# Patient Record
Sex: FEMALE | Race: WHITE | Employment: PART TIME | URBAN - METROPOLITAN AREA
[De-identification: names, ages, dates, MRNs, and addresses within clinical notes are randomized per-mention and may not be internally consistent; named-entity substitution may affect disease eponyms.]

---

## 2020-02-28 ENCOUNTER — HOSPITAL ENCOUNTER (OUTPATIENT)
Dept: CARDIAC REHAB | Age: 75
Setting detail: THERAPIES SERIES
Discharge: HOME | End: 2020-02-28
Payer: MEDICARE

## 2020-02-28 VITALS
HEIGHT: 68 IN | RESPIRATION RATE: 18 BRPM | OXYGEN SATURATION: 98 % | DIASTOLIC BLOOD PRESSURE: 68 MMHG | WEIGHT: 138 LBS | HEART RATE: 85 BPM | BODY MASS INDEX: 20.92 KG/M2 | SYSTOLIC BLOOD PRESSURE: 110 MMHG

## 2020-02-28 RX ORDER — ACETAMINOPHEN 160 MG
2000 TABLET,DISINTEGRATING ORAL
COMMUNITY

## 2020-02-28 RX ORDER — ATORVASTATIN CALCIUM 40 MG/1
40 TABLET, FILM COATED ORAL DAILY
COMMUNITY

## 2020-02-28 RX ORDER — MIRTAZAPINE 30 MG/1
30 TABLET, FILM COATED ORAL NIGHTLY
COMMUNITY

## 2020-02-28 RX ORDER — ALBUTEROL SULFATE 2.5 MG/3ML
2.5 SOLUTION RESPIRATORY (INHALATION) 2 TIMES DAILY
COMMUNITY

## 2020-02-28 RX ORDER — HYDROXYCHLOROQUINE SULFATE 200 MG/1
200 TABLET, FILM COATED ORAL 2 TIMES DAILY
COMMUNITY

## 2020-02-28 RX ORDER — BUDESONIDE AND FORMOTEROL FUMARATE DIHYDRATE 160; 4.5 UG/1; UG/1
2 AEROSOL RESPIRATORY (INHALATION) 2 TIMES DAILY
COMMUNITY

## 2020-02-28 RX ORDER — OMEPRAZOLE 20 MG/1
20 CAPSULE, DELAYED RELEASE ORAL 2 TIMES DAILY
COMMUNITY

## 2020-02-28 RX ORDER — ESCITALOPRAM OXALATE 20 MG/1
20 TABLET ORAL DAILY
COMMUNITY

## 2020-02-28 RX ORDER — ASCORBIC ACID 500 MG
1000 TABLET ORAL DAILY
COMMUNITY

## 2020-02-28 SDOH — HEALTH STABILITY: MENTAL HEALTH: HOW OFTEN DO YOU HAVE A DRINK CONTAINING ALCOHOL?: NEVER

## 2020-02-28 ASSESSMENT — PATIENT HEALTH QUESTIONNAIRE - PHQ9: SUM OF ALL RESPONSES TO PHQ QUESTIONS 1-9: 0

## 2020-03-02 ENCOUNTER — HOSPITAL ENCOUNTER (OUTPATIENT)
Dept: CARDIAC REHAB | Age: 75
Setting detail: THERAPIES SERIES
Discharge: HOME | End: 2020-03-02
Payer: MEDICARE

## 2020-03-02 PROCEDURE — 93798 PHYS/QHP OP CAR RHAB W/ECG: CPT

## 2020-03-04 ENCOUNTER — HOSPITAL ENCOUNTER (OUTPATIENT)
Dept: CARDIAC REHAB | Age: 75
Setting detail: THERAPIES SERIES
Discharge: HOME | End: 2020-03-04
Payer: MEDICARE

## 2020-03-04 PROCEDURE — 93798 PHYS/QHP OP CAR RHAB W/ECG: CPT

## 2020-03-06 ENCOUNTER — HOSPITAL ENCOUNTER (OUTPATIENT)
Dept: CARDIAC REHAB | Age: 75
Setting detail: THERAPIES SERIES
Discharge: HOME | End: 2020-03-06
Payer: MEDICARE

## 2020-03-06 PROCEDURE — 93798 PHYS/QHP OP CAR RHAB W/ECG: CPT

## 2020-03-09 ENCOUNTER — HOSPITAL ENCOUNTER (OUTPATIENT)
Dept: CARDIAC REHAB | Age: 75
Setting detail: THERAPIES SERIES
Discharge: HOME | End: 2020-03-09
Payer: MEDICARE

## 2020-03-09 PROCEDURE — 93798 PHYS/QHP OP CAR RHAB W/ECG: CPT

## 2020-03-11 ENCOUNTER — HOSPITAL ENCOUNTER (OUTPATIENT)
Dept: CARDIAC REHAB | Age: 75
Setting detail: THERAPIES SERIES
Discharge: HOME | End: 2020-03-11
Payer: MEDICARE

## 2020-03-11 PROCEDURE — 93798 PHYS/QHP OP CAR RHAB W/ECG: CPT

## 2020-03-13 ENCOUNTER — HOSPITAL ENCOUNTER (OUTPATIENT)
Dept: CARDIAC REHAB | Age: 75
Setting detail: THERAPIES SERIES
End: 2020-03-13
Payer: MEDICARE

## 2020-03-16 ENCOUNTER — HOSPITAL ENCOUNTER (OUTPATIENT)
Dept: CARDIAC REHAB | Age: 75
Setting detail: THERAPIES SERIES
End: 2020-03-16
Payer: MEDICARE

## 2020-03-18 ENCOUNTER — APPOINTMENT (OUTPATIENT)
Dept: CARDIAC REHAB | Age: 75
End: 2020-03-18
Payer: MEDICARE

## 2020-03-20 ENCOUNTER — APPOINTMENT (OUTPATIENT)
Dept: CARDIAC REHAB | Age: 75
End: 2020-03-20
Payer: MEDICARE

## 2020-03-23 ENCOUNTER — APPOINTMENT (OUTPATIENT)
Dept: CARDIAC REHAB | Age: 75
End: 2020-03-23
Payer: MEDICARE

## 2020-03-25 ENCOUNTER — APPOINTMENT (OUTPATIENT)
Dept: CARDIAC REHAB | Age: 75
End: 2020-03-25
Payer: MEDICARE

## 2020-03-27 ENCOUNTER — APPOINTMENT (OUTPATIENT)
Dept: CARDIAC REHAB | Age: 75
End: 2020-03-27
Payer: MEDICARE

## 2020-03-30 ENCOUNTER — HOSPITAL ENCOUNTER (OUTPATIENT)
Dept: CARDIAC REHAB | Age: 75
Setting detail: THERAPIES SERIES
End: 2020-03-30
Payer: MEDICARE

## 2020-04-06 ENCOUNTER — TELEPHONE (OUTPATIENT)
Dept: CARDIAC REHAB | Age: 75
End: 2020-04-06

## 2020-06-02 ENCOUNTER — TELEPHONE (OUTPATIENT)
Dept: CARDIAC REHAB | Age: 75
End: 2020-06-02

## 2024-05-28 PROBLEM — R92.30 DENSE BREAST TISSUE: Status: ACTIVE | Noted: 2024-05-28

## 2024-05-28 PROBLEM — Z12.31 BREAST CANCER SCREENING BY MAMMOGRAM: Status: ACTIVE | Noted: 2024-05-28

## 2024-05-28 PROBLEM — Z85.3 ENCOUNTER FOR FOLLOW-UP SURVEILLANCE OF BREAST CANCER: Status: ACTIVE | Noted: 2024-05-28

## 2024-05-28 PROBLEM — Z08 ENCOUNTER FOR FOLLOW-UP SURVEILLANCE OF BREAST CANCER: Status: ACTIVE | Noted: 2024-05-28

## 2024-05-28 NOTE — PROGRESS NOTES
[facility], [place], operated by [facility]  Winston Jimenez female   1945 78 y.o.   [id number]      Chief Complaint  Annual mammogram and exam, history left breast cancer.    History Of Present Illness  Winston Jimenez is a very pleasant 78 y.o.  female diagnosed 2002 with left invasive ductal carcinoma. Her tumor was 4 cm, grade 3, ER +50%, CO negative, HER-2/khurram-positive (3+). She had left lumpectomy with axillary lymph node dissection, + positive lymph nodes. She required a re-excision to clear her margins. Patient was on BIRG 006 trial with chemotherapy AC x4, Taxotere x4+ one year of Herceptin therapy. She completed radiation therapy. She took Tamoxifen 2003 to May 2005. She took Arimidex 2005-2008. She was genetically tested BRCA Gene mutation carrier negative. She has family history of breast cancer in her mother diagnosed age 65. Her sister was diagnosed with ovarian and uterine cancer and and lived one year beyond her diagnosis. She had open heart surgery in  with aortic valve replacement and mitral valve repair at [facility]. She has an autoimmune skin condition granuloma annular. She has personal history of nonmelanoma skin cancer & sees dermatology regularly. She presents today for annual mammogram and exam.  T3 N2 M0    BREAST IMAGIN2024 Bilateral screening mammogram, indicates BI-RADS Category 2.    REPRODUCTIVE HISTORY: menarche age 14, , first birth age 21, natural menopause age 56 s/p total hysterectomy years later, heterogeneously dense breast tissue    FAMILY CANCER HISTORY  Mother: Breast cancer age 65  Sister: Uterine & Ovarian cancer  Paternal grandmother: Colon cancer age 65      Review of Systems  Constitutional:  Negative for appetite change, fatigue, fever and unexpected weight change.   HENT:  Negative for ear pain, hearing loss, nosebleeds, sore throat and trouble swallowing.    Eyes:  Negative for discharge, itching and visual disturbance.   Breast:  As stated in HPI.  Respiratory:  Negative for cough, chest tightness and shortness of breath.    Cardiovascular:  Negative for chest pain, palpitations and leg swelling.   Gastrointestinal:  Negative for abdominal pain, constipation, diarrhea and nausea.   Endocrine: Negative for cold intolerance and heat intolerance.   Genitourinary:  Negative for dysuria, frequency, hematuria, pelvic pain and vaginal bleeding.   Musculoskeletal:  Negative for arthralgias, back pain, gait problem, joint swelling and myalgias.   Skin:  Negative for color change and rash.   Allergic/Immunologic: Negative for environmental allergies and food allergies.   Neurological:  Negative for dizziness, tremors, speech difficulty, weakness, numbness and headaches.   Hematological:  Does not bruise/bleed easily.   Psychiatric/Behavioral:  Negative for agitation, dysphoric mood and sleep disturbance. The patient is not nervous/anxious.      Past Medical History  She has a past medical history of Breast cancer (Multi), antineoplastic chemo, Pain in unspecified hand (04/18/2017), Personal history of irradiation, Personal history of malignant neoplasm of breast (09/26/2018), Personal history of other diseases of the circulatory system, Personal history of other diseases of the nervous system and sense organs, Personal history of other mental and behavioral disorders, Personal history of other mental and behavioral disorders, and Stress incontinence (female) (male).    Surgical History  She has a past surgical history that includes Breast lumpectomy (10/15/2013); Other surgical history (10/15/2013); and Hysterectomy (10/21/2015).    Family History  Cancer-related family history is not on file.     Social History  She reports that she has never smoked. She has never used smokeless tobacco. She reports that she does not currently use alcohol. She reports that she does not use drugs.    Allergies  Codeine; Latex, natural rubber; Mold; Pollen extracts;  and Neomycin-bacitracnzn-polymyxnb    Medications  Current Outpatient Medications   Medication Instructions    albuterol 2.5 mg /3 mL (0.083 %) nebulizer solution INHALE THE CONTENTS OF 1 VIAL VIA NEBULIZER 4 TIMES A DAY AS NEEDED FOR COUGH  WHEEZE  OR SHORTNESS OF BREATH    albuterol 90 mcg/actuation inhaler INHALE 2 PUFFS INTO THE LUNGS 4 TIMES A DAY AS NEEDED FOR COUGH  WHEEZING  OR SHORTNESS OF BREATH    amitriptyline (Elavil) 25 mg tablet     amoxicillin (Amoxil) 500 mg capsule TAKE 4 CAPSULES BY MOUTH 1 HOUR PRIOR TO DENTAL APPOINTMENT    aspirin (Aspir-81) 81 mg EC tablet oral    atorvastatin (Lipitor) 40 mg tablet 1 tablet, oral, Daily    beclomethasone dipropionate (Qvar) 80 mcg/actuation inhaler     budesonide-formoteroL (Symbicort) 160-4.5 mcg/actuation inhaler 2 puffs, inhalation, 2 times daily    budesonide-formoteroL (Symbicort) 160-4.5 mcg/actuation inhaler oral    carvedilol (Coreg) 3.125 mg tablet 1 tablet, oral, 2 times daily    cholecalciferol (Vitamin D-3) 50 mcg (2,000 unit) capsule oral    desvenlafaxine 100 mg 24 hr tablet 1 tablet, oral, Daily    Entresto 24-26 mg tablet 1 tablet, oral, 2 times daily    eplerenone (Inspra) 25 mg tablet 1 tablet, oral, Daily    escitalopram (Lexapro) 20 mg tablet 1 tablet, oral, Nightly    fluticasone propion-salmeteroL (Advair HFA) 230-21 mcg/actuation inhaler     [START ON 7/1/2024] hydroxychloroquine (Plaquenil) 200 mg tablet 1 tablet, oral, Daily    melatonin 3 mg tablet oral    neomycin-polymyxin-dexAMETHasone (Maxitrol) 3.5mg/mL-10,000 unit/mL-0.1 % ophthalmic suspension     ofloxacin (Ocuflox) 0.3 % ophthalmic solution     omeprazole (PRILOSEC) 20 mg, oral, 2 times daily    perphenazine-amitriptyline 4-50 mg tablet 1 tablet, oral, Nightly       Last Recorded Vitals  Vitals:    05/29/24 0847   BP: 112/65   Pulse: 65       Physical Exam  Chest:         Patient is alert and oriented x3 and in a relaxed and appropriate mood. Her gait is steady and hand  grasps are equal. Sclera is clear. The breasts are slightly asymmetrical right > left. The left breast has a well healed inframammary incision with tissue divot and axillary incision. The skin is thickened s/p radiation changes. The right breast tissue is soft without palpable abnormalities, discrete nodules or masses. The right breast skin and bilateral nipples appear normal. There is no cervical, supraclavicular or axillary lymphadenopathy. Heart rate and rhythm normal, murmur appreciated. The lungs are clear to auscultation bilaterally. Abdomen is soft and non-tender.     Relevant Results and Imaging  Study Result    Narrative & Impression   Interpreted By:  Pauline Lees,   STUDY:  BI MAMMO BILATERAL SCREENING TOMOSYNTHESIS;  5/29/2024 8:37 am      ACCESSION NUMBER(S):  [id number]      ORDERING CLINICIAN:  ARGENTINA STYLES      INDICATION:  Screening. Left lumpectomy with radiation treatment and chemotherapy.      COMPARISON:  05/24/2023, 04/26/2022, 04/13/2021, 03/12/2019      FINDINGS:  2D and tomosynthesis images were reviewed at 1 mm slice thickness.      Density:  The breast tissue is heterogeneously dense, which may  obscure small masses.      Stable postsurgical scarring in the deep central left breast, the  site of lumpectomy. Stable postsurgical scarring overlying the left  axilla. Stable minimal trabecular thickening of the left breast  consistent with radiation treatment. No suspicious masses or  calcifications are identified within either breast.      IMPRESSION:  No mammographic evidence of malignancy. Stable postoperative and  postradiation changes, left breast. Routine postsurgical surveillance  recommended.      BI-RADS CATEGORY:      BI-RADS Category:  2 Benign.  Recommendation:  Annual Screening.  Recommended Date:  1 Year.  Laterality:  Bilateral.      For any future breast imaging appointments, please call [phone]  [phone]).                  MACRO:  None      Signed by: Pauline Lees 5/29/2024  8:47 AM     I explained the results in depth, along with suggested explanation for follow up recommendations based on the testing results. BI-RADS Category 2    Orders  Orders Placed This Encounter   Procedures    BI mammo bilateral screening tomosynthesis     Standing Status:   Future     Standing Expiration Date:   7/29/2025     Order Specific Question:   Reason for exam:     Answer:   annual screening mammogram     Order Specific Question:   Radiologist to Determine Optimal Study     Answer:   Yes     Order Specific Question:   Release result to [employer]     Answer:   Immediate     Order Specific Question:   Is this exam part of a Research Study? If Yes, link this order to the research study     Answer:   No       Visit Diagnosis  1. Encounter for follow-up surveillance of breast cancer  Clinic Appointment Request Follow Up    CANCELED: Clinic Appointment Request Follow Up      2. Dense breast tissue        3. Breast cancer screening by mammogram  BI mammo bilateral screening tomosynthesis    CANCELED: BI mammo bilateral screening tomosynthesis      4. BRCA negative            Assessment/Plan  Normal clinical exam and imaging, remote history left breast cancer, s/p left breast lumpectomy, s/p chemotherapy, s/p radiation therapy, s/p endocrine therapy, BRCA negative, family history breast cancer, heterogeneously dense     Plan:  Return May 2025 for bilateral screening mammogram and exam. Prefers to follow in the breast center.    Patient Discussion/Summary  Your clinical examination and imaging are normal. Please return in one year for mammogram and office visit or sooner if you have any problems or concerns.     You can see your health information, review clinical summaries from office visits & test results online when you follow your health with MY  Chart, a personal health record. To sign up go to [email or web address]. If you need assistance with signing up or trouble getting into your account call  [facility] Patient Line 24/7 at [phone].    My office phone number is [phone] if you need to get in touch with me or have additional questions or concerns. Thank you for choosing [facility] and trusting me as your healthcare provider. I look forward to seeing you again at your next office visit. I am honored to be a provider on your health care team and I remain dedicated to helping you achieve your health goals.    Lavern Oliveros, APRN-CNP

## 2024-05-29 ENCOUNTER — OFFICE VISIT (OUTPATIENT)
Dept: SURGICAL ONCOLOGY | Facility: CLINIC | Age: 79
End: 2024-05-29
Payer: MEDICARE

## 2024-05-29 ENCOUNTER — HOSPITAL ENCOUNTER (OUTPATIENT)
Dept: RADIOLOGY | Facility: CLINIC | Age: 79
Discharge: HOME | End: 2024-05-29
Payer: MEDICARE

## 2024-05-29 VITALS — WEIGHT: 136.91 LBS | BODY MASS INDEX: 20.75 KG/M2 | HEIGHT: 68 IN

## 2024-05-29 VITALS
WEIGHT: 134.6 LBS | SYSTOLIC BLOOD PRESSURE: 112 MMHG | HEIGHT: 67 IN | HEART RATE: 65 BPM | BODY MASS INDEX: 21.12 KG/M2 | DIASTOLIC BLOOD PRESSURE: 65 MMHG

## 2024-05-29 DIAGNOSIS — Z85.3 ENCOUNTER FOR FOLLOW-UP SURVEILLANCE OF BREAST CANCER: Primary | ICD-10-CM

## 2024-05-29 DIAGNOSIS — Z08 ENCOUNTER FOR FOLLOW-UP SURVEILLANCE OF BREAST CANCER: Primary | ICD-10-CM

## 2024-05-29 DIAGNOSIS — Z13.71 BRCA NEGATIVE: ICD-10-CM

## 2024-05-29 DIAGNOSIS — R92.30 DENSE BREAST TISSUE: ICD-10-CM

## 2024-05-29 DIAGNOSIS — Z12.31 ENCOUNTER FOR SCREENING MAMMOGRAM FOR MALIGNANT NEOPLASM OF BREAST: ICD-10-CM

## 2024-05-29 DIAGNOSIS — Z12.31 BREAST CANCER SCREENING BY MAMMOGRAM: ICD-10-CM

## 2024-05-29 PROCEDURE — 1126F AMNT PAIN NOTED NONE PRSNT: CPT | Performed by: NURSE PRACTITIONER

## 2024-05-29 PROCEDURE — 77067 SCR MAMMO BI INCL CAD: CPT

## 2024-05-29 PROCEDURE — 77067 SCR MAMMO BI INCL CAD: CPT | Mod: BILATERAL PROCEDURE | Performed by: RADIOLOGY

## 2024-05-29 PROCEDURE — 1159F MED LIST DOCD IN RCRD: CPT | Performed by: NURSE PRACTITIONER

## 2024-05-29 PROCEDURE — 99213 OFFICE O/P EST LOW 20 MIN: CPT | Performed by: NURSE PRACTITIONER

## 2024-05-29 PROCEDURE — 77063 BREAST TOMOSYNTHESIS BI: CPT | Mod: BILATERAL PROCEDURE | Performed by: RADIOLOGY

## 2024-05-29 RX ORDER — ALBUTEROL SULFATE 90 UG/1
AEROSOL, METERED RESPIRATORY (INHALATION)
COMMUNITY

## 2024-05-29 RX ORDER — CARVEDILOL 3.12 MG/1
1 TABLET ORAL 2 TIMES DAILY
COMMUNITY
Start: 2024-04-29

## 2024-05-29 RX ORDER — ACETAMINOPHEN 500 MG
TABLET ORAL
COMMUNITY

## 2024-05-29 RX ORDER — HYDROXYCHLOROQUINE SULFATE 200 MG/1
1 TABLET, FILM COATED ORAL DAILY
COMMUNITY
Start: 2024-07-01

## 2024-05-29 RX ORDER — FLUTICASONE PROPIONATE AND SALMETEROL XINAFOATE 230; 21 UG/1; UG/1
AEROSOL, METERED RESPIRATORY (INHALATION)
COMMUNITY

## 2024-05-29 RX ORDER — ALBUTEROL SULFATE 0.83 MG/ML
SOLUTION RESPIRATORY (INHALATION)
COMMUNITY
Start: 2021-06-21

## 2024-05-29 RX ORDER — AMOXICILLIN 500 MG/1
CAPSULE ORAL
COMMUNITY

## 2024-05-29 RX ORDER — SACUBITRIL AND VALSARTAN 24; 26 MG/1; MG/1
1 TABLET, FILM COATED ORAL 2 TIMES DAILY
COMMUNITY
Start: 2023-06-09

## 2024-05-29 RX ORDER — OFLOXACIN 3 MG/ML
SOLUTION/ DROPS OPHTHALMIC
COMMUNITY

## 2024-05-29 RX ORDER — NEOMYCIN SULFATE, POLYMYXIN B SULFATE AND DEXAMETHASONE 3.5; 10000; 1 MG/ML; [USP'U]/ML; MG/ML
SUSPENSION/ DROPS OPHTHALMIC
COMMUNITY

## 2024-05-29 RX ORDER — BUDESONIDE AND FORMOTEROL FUMARATE DIHYDRATE 160; 4.5 UG/1; UG/1
AEROSOL RESPIRATORY (INHALATION)
COMMUNITY
Start: 2023-09-14

## 2024-05-29 RX ORDER — OMEPRAZOLE 20 MG/1
20 CAPSULE, DELAYED RELEASE ORAL 2 TIMES DAILY
COMMUNITY

## 2024-05-29 RX ORDER — DESVENLAFAXINE 100 MG/1
1 TABLET, EXTENDED RELEASE ORAL DAILY
COMMUNITY
Start: 2023-07-24

## 2024-05-29 RX ORDER — AMITRIPTYLINE HYDROCHLORIDE 25 MG/1
TABLET, FILM COATED ORAL
COMMUNITY

## 2024-05-29 RX ORDER — ESCITALOPRAM OXALATE 20 MG/1
1 TABLET ORAL NIGHTLY
COMMUNITY
Start: 2023-08-09

## 2024-05-29 RX ORDER — ATORVASTATIN CALCIUM 40 MG/1
1 TABLET, FILM COATED ORAL DAILY
COMMUNITY
Start: 2016-06-09

## 2024-05-29 RX ORDER — PERPHENAZINE AND AMITRIPTYLINE HYDROCHLORIDE 4; 50 MG/1; MG/1
1 TABLET, FILM COATED ORAL NIGHTLY
COMMUNITY
Start: 2023-08-24

## 2024-05-29 RX ORDER — ASPIRIN 81 MG/1
TABLET ORAL
COMMUNITY

## 2024-05-29 RX ORDER — TALC
POWDER (GRAM) TOPICAL
COMMUNITY
Start: 2021-04-13

## 2024-05-29 RX ORDER — BUDESONIDE AND FORMOTEROL FUMARATE DIHYDRATE 160; 4.5 UG/1; UG/1
2 AEROSOL RESPIRATORY (INHALATION) 2 TIMES DAILY
COMMUNITY
Start: 2016-05-03

## 2024-05-29 RX ORDER — EPLERENONE 25 MG/1
1 TABLET, FILM COATED ORAL DAILY
COMMUNITY

## 2024-05-29 ASSESSMENT — PAIN SCALES - GENERAL: PAINLEVEL: 0-NO PAIN

## 2025-04-02 ENCOUNTER — TRANSCRIBE ORDERS (OUTPATIENT)
Dept: ADMINISTRATIVE | Age: 80
End: 2025-04-02

## 2025-04-02 DIAGNOSIS — J47.9 BRONCHIECTASIS WITHOUT ACUTE EXACERBATION (HCC): Primary | ICD-10-CM

## 2025-05-27 ASSESSMENT — ENCOUNTER SYMPTOMS
PSYCHIATRIC NEGATIVE: 1
HEMATOLOGIC/LYMPHATIC NEGATIVE: 1
EYES NEGATIVE: 1
CARDIOVASCULAR NEGATIVE: 1
GASTROINTESTINAL NEGATIVE: 1
RESPIRATORY NEGATIVE: 1
ENDOCRINE NEGATIVE: 1
NEUROLOGICAL NEGATIVE: 1
MUSCULOSKELETAL NEGATIVE: 1
CONSTITUTIONAL NEGATIVE: 1

## 2025-05-27 NOTE — PROGRESS NOTES
[facility]  Winston Jimenez female   1945 79 y.o.   [id number]      Chief Complaint  Annual mammogram and exam, history left breast cancer.    History Of Present Illness  Winston Jimenez is a very pleasant 79 y.o.  female diagnosed 2002 with left invasive ductal carcinoma. Her tumor was 4 cm, grade 3, ER +50%, MS negative, HER-2/khurram-positive (3+). She had left lumpectomy with axillary lymph node dissection, + positive lymph nodes. She required a re-excision to clear her margins. Patient was on BIRG 006 trial with chemotherapy AC x4, Taxotere x4+ one year of Herceptin therapy. She completed radiation therapy. She took Tamoxifen 2003 to May 2005. She took Arimidex 2005-2008. She was genetically tested BRCA Gene mutation carrier negative. She has family history of breast cancer in her mother diagnosed age 65. Her sister was diagnosed with ovarian and uterine cancer and and lived one year beyond her diagnosis. She had open heart surgery in  with aortic valve replacement and mitral valve repair at [facility]. She has an autoimmune skin condition granuloma annular. She has personal history of nonmelanoma skin cancer & sees dermatology regularly. She presents today for annual mammogram and exam.  T3 N2 M0    BREAST IMAGIN2024 Bilateral screening mammogram, indicates BI-RADS Category 2.    REPRODUCTIVE HISTORY: menarche age 14, , first birth age 21, natural menopause age 56 s/p total hysterectomy years later, heterogeneously dense breast tissue    FAMILY CANCER HISTORY  Mother: Breast cancer age 65  Sister: Uterine & Ovarian cancer  Paternal grandmother: Colon cancer age 65      Review of Systems  Review of Systems   Constitutional: Negative.    HENT:  Negative.     Eyes: Negative.    Respiratory: Negative.     Cardiovascular: Negative.    Gastrointestinal: Negative.    Endocrine: Negative.    Genitourinary: Negative.     Musculoskeletal: Negative.     Neurological: Negative.    Hematological: Negative.    Psychiatric/Behavioral: Negative.           Past Medical History  She has a past medical history of Breast cancer, antineoplastic chemo, Pain in unspecified hand (04/18/2017), Personal history of irradiation, Personal history of malignant neoplasm of breast (09/26/2018), Personal history of other diseases of the circulatory system, Personal history of other diseases of the nervous system and sense organs, Personal history of other mental and behavioral disorders, Personal history of other mental and behavioral disorders, and Stress incontinence (female) (male).    Surgical History  She has a past surgical history that includes Breast lumpectomy (10/15/2013); Other surgical history (10/15/2013); and Hysterectomy (10/21/2015).    Family History  Cancer-related family history is not on file.     Social History  She reports that she has never smoked. She has never used smokeless tobacco. She reports that she does not currently use alcohol. She reports that she does not use drugs.    Allergies  Codeine; Latex, natural rubber; Mold; Pollen extracts; and Neomycin-bacitracnzn-polymyxnb    Medications  Current Outpatient Medications   Medication Instructions    albuterol 2.5 mg /3 mL (0.083 %) nebulizer solution INHALE THE CONTENTS OF 1 VIAL VIA NEBULIZER 4 TIMES A DAY AS NEEDED FOR COUGH  WHEEZE  OR SHORTNESS OF BREATH    albuterol 90 mcg/actuation inhaler INHALE 2 PUFFS INTO THE LUNGS 4 TIMES A DAY AS NEEDED FOR COUGH  WHEEZING  OR SHORTNESS OF BREATH    amitriptyline (Elavil) 25 mg tablet     amoxicillin (Amoxil) 500 mg capsule TAKE 4 CAPSULES BY MOUTH 1 HOUR PRIOR TO DENTAL APPOINTMENT    aspirin (Aspir-81) 81 mg EC tablet Take by mouth.    atorvastatin (Lipitor) 40 mg tablet 1 tablet, Daily    beclomethasone dipropionate (Qvar) 80 mcg/actuation inhaler     budesonide-formoteroL (Symbicort) 160-4.5 mcg/actuation inhaler 2 puffs, 2 times daily     budesonide-formoteroL (Symbicort) 160-4.5 mcg/actuation inhaler Take by mouth.    carvedilol (Coreg) 3.125 mg tablet 1 tablet, 2 times daily    cholecalciferol (Vitamin D-3) 50 mcg (2,000 unit) capsule Take by mouth.    desvenlafaxine 100 mg 24 hr tablet 1 tablet, Daily    Entresto 24-26 mg tablet 1 tablet, 2 times daily    eplerenone (Inspra) 25 mg tablet 1 tablet, Daily    escitalopram (Lexapro) 20 mg tablet 1 tablet, Nightly    fluticasone propion-salmeteroL (Advair HFA) 230-21 mcg/actuation inhaler     hydroxychloroquine (Plaquenil) 200 mg tablet 1 tablet, Daily    melatonin 3 mg tablet Take by mouth.    neomycin-polymyxin-dexAMETHasone (Maxitrol) 3.5mg/mL-10,000 unit/mL-0.1 % ophthalmic suspension     ofloxacin (Ocuflox) 0.3 % ophthalmic solution     omeprazole (PRILOSEC) 20 mg, 2 times daily    perphenazine-amitriptyline 4-50 mg tablet 1 tablet, Nightly    Trelegy Ellipta 100-62.5-25 mcg blister with device 1 puff, Daily       Last Recorded Vitals  Vitals:    06/04/25 0826   BP: 134/64   Pulse: 58   Temp: 36.1 °C (97 °F)   SpO2: 99%         Physical Exam  Chest:         Patient is alert and oriented x3 and in a relaxed and appropriate mood. Her gait is steady and hand grasps are equal. Sclera is clear. The breasts are slightly asymmetrical right > left. The left breast has a well healed inframammary incision with tissue divot and axillary incision. The skin is thickened s/p radiation changes. The right breast tissue is soft without palpable abnormalities, discrete nodules or masses. The right breast skin and bilateral nipples appear normal. There is no cervical, supraclavicular or axillary lymphadenopathy.       Relevant Results and Imaging  Pending        Orders  Orders Placed This Encounter   Procedures    BI mammo bilateral screening tomosynthesis     Standing Status:   Future     Expected Date:   6/5/2026     Expiration Date:   7/4/2026     Reason for exam::   annual screening mammogram     Radiologist to  Determine Optimal Study:   Yes     Release result to [employer]:   Immediate     Is this exam part of a Research Study? If Yes, link this order to the research study:   No       Visit Diagnosis  1. Breast cancer screening by mammogram  Clinic Appointment Request Follow Up    BI mammo bilateral screening tomosynthesis      2. BRCA negative        3. Dense breast tissue        4. Encounter for follow-up surveillance of breast cancer  Clinic Appointment Request Follow Up          Assessment/Plan  Normal clinical exam, remote history left breast cancer, s/p left breast lumpectomy, s/p chemotherapy, s/p radiation therapy, s/p endocrine therapy, BRCA negative, family history breast cancer, heterogeneously dense     Plan:  Return May 2026 for bilateral screening mammogram and exam. Prefers to follow in the breast center.    Patient Discussion/Summary  Your clinical examination is normal. You had a screening mammogram today and the results are pending. I will inform you if the screening mammogram is abnormal. Please return in one year for a screening mammogram and office visit or sooner if you have any questions or concerns.       You can see your health information, review clinical summaries from office visits & test results online when you follow your health with MY  Chart, a personal health record. To sign up go to [email or web address]. If you need assistance with signing up or trouble getting into your account call [employer] Patient Line 24/7 at [phone].    My office phone number is [phone] if you need to get in touch with me or have additional questions or concerns. Thank you for choosing [facility] and trusting me as your healthcare provider. I look forward to seeing you again at your next office visit. I am honored to be a provider on your health care team and I remain dedicated to helping you achieve your health goals.    Lavern Oliveros, JOSSE-CNP

## 2025-06-04 ENCOUNTER — HOSPITAL ENCOUNTER (OUTPATIENT)
Dept: RADIOLOGY | Facility: CLINIC | Age: 80
Discharge: HOME | End: 2025-06-04
Payer: MEDICARE

## 2025-06-04 ENCOUNTER — OFFICE VISIT (OUTPATIENT)
Dept: SURGICAL ONCOLOGY | Facility: CLINIC | Age: 80
End: 2025-06-04
Payer: MEDICARE

## 2025-06-04 VITALS
TEMPERATURE: 97 F | SYSTOLIC BLOOD PRESSURE: 134 MMHG | OXYGEN SATURATION: 99 % | HEART RATE: 58 BPM | WEIGHT: 137.46 LBS | DIASTOLIC BLOOD PRESSURE: 64 MMHG | BODY MASS INDEX: 21.53 KG/M2

## 2025-06-04 DIAGNOSIS — R92.30 DENSE BREAST TISSUE: ICD-10-CM

## 2025-06-04 DIAGNOSIS — Z13.71 BRCA NEGATIVE: ICD-10-CM

## 2025-06-04 DIAGNOSIS — Z12.31 BREAST CANCER SCREENING BY MAMMOGRAM: Primary | ICD-10-CM

## 2025-06-04 DIAGNOSIS — Z08 ENCOUNTER FOR FOLLOW-UP SURVEILLANCE OF BREAST CANCER: ICD-10-CM

## 2025-06-04 DIAGNOSIS — Z12.31 BREAST CANCER SCREENING BY MAMMOGRAM: ICD-10-CM

## 2025-06-04 DIAGNOSIS — Z85.3 ENCOUNTER FOR FOLLOW-UP SURVEILLANCE OF BREAST CANCER: ICD-10-CM

## 2025-06-04 PROCEDURE — 1159F MED LIST DOCD IN RCRD: CPT | Performed by: NURSE PRACTITIONER

## 2025-06-04 PROCEDURE — 1126F AMNT PAIN NOTED NONE PRSNT: CPT | Performed by: NURSE PRACTITIONER

## 2025-06-04 PROCEDURE — 77067 SCR MAMMO BI INCL CAD: CPT

## 2025-06-04 PROCEDURE — 99213 OFFICE O/P EST LOW 20 MIN: CPT | Performed by: NURSE PRACTITIONER

## 2025-06-04 PROCEDURE — 1036F TOBACCO NON-USER: CPT | Performed by: NURSE PRACTITIONER

## 2025-06-04 PROCEDURE — 77063 BREAST TOMOSYNTHESIS BI: CPT | Performed by: RADIOLOGY

## 2025-06-04 PROCEDURE — 77067 SCR MAMMO BI INCL CAD: CPT | Performed by: RADIOLOGY

## 2025-06-04 RX ORDER — FLUTICASONE FUROATE, UMECLIDINIUM BROMIDE AND VILANTEROL TRIFENATATE 100; 62.5; 25 UG/1; UG/1; UG/1
1 POWDER RESPIRATORY (INHALATION) DAILY
COMMUNITY

## 2025-06-04 ASSESSMENT — PATIENT HEALTH QUESTIONNAIRE - PHQ9
1. LITTLE INTEREST OR PLEASURE IN DOING THINGS: NOT AT ALL
SUM OF ALL RESPONSES TO PHQ9 QUESTIONS 1 & 2: 0
2. FEELING DOWN, DEPRESSED OR HOPELESS: NOT AT ALL

## 2025-06-04 ASSESSMENT — PAIN SCALES - GENERAL: PAINLEVEL_OUTOF10: 0-NO PAIN
